# Patient Record
Sex: MALE | Race: BLACK OR AFRICAN AMERICAN | NOT HISPANIC OR LATINO | URBAN - METROPOLITAN AREA
[De-identification: names, ages, dates, MRNs, and addresses within clinical notes are randomized per-mention and may not be internally consistent; named-entity substitution may affect disease eponyms.]

---

## 2018-08-14 ENCOUNTER — EMERGENCY (EMERGENCY)
Facility: HOSPITAL | Age: 53
LOS: 1 days | Discharge: HOME | End: 2018-08-14
Attending: EMERGENCY MEDICINE | Admitting: EMERGENCY MEDICINE
Payer: COMMERCIAL

## 2018-08-14 VITALS
SYSTOLIC BLOOD PRESSURE: 131 MMHG | RESPIRATION RATE: 18 BRPM | TEMPERATURE: 98 F | DIASTOLIC BLOOD PRESSURE: 85 MMHG | HEART RATE: 72 BPM | OXYGEN SATURATION: 98 %

## 2018-08-14 DIAGNOSIS — Z79.899 OTHER LONG TERM (CURRENT) DRUG THERAPY: ICD-10-CM

## 2018-08-14 DIAGNOSIS — R07.89 OTHER CHEST PAIN: ICD-10-CM

## 2018-08-14 DIAGNOSIS — I10 ESSENTIAL (PRIMARY) HYPERTENSION: ICD-10-CM

## 2018-08-14 LAB
ALBUMIN SERPL ELPH-MCNC: 4.1 G/DL — SIGNIFICANT CHANGE UP (ref 3.4–5)
ALP SERPL-CCNC: 68 U/L — SIGNIFICANT CHANGE UP (ref 40–120)
ALT FLD-CCNC: 26 U/L — SIGNIFICANT CHANGE UP (ref 12–42)
ANION GAP SERPL CALC-SCNC: 13 MMOL/L — SIGNIFICANT CHANGE UP (ref 9–16)
AST SERPL-CCNC: 16 U/L — SIGNIFICANT CHANGE UP (ref 15–37)
BASOPHILS NFR BLD AUTO: 1.1 % — SIGNIFICANT CHANGE UP (ref 0–2)
BILIRUB SERPL-MCNC: 0.6 MG/DL — SIGNIFICANT CHANGE UP (ref 0.2–1.2)
BUN SERPL-MCNC: 14 MG/DL — SIGNIFICANT CHANGE UP (ref 7–23)
CALCIUM SERPL-MCNC: 9.3 MG/DL — SIGNIFICANT CHANGE UP (ref 8.5–10.5)
CHLORIDE SERPL-SCNC: 102 MMOL/L — SIGNIFICANT CHANGE UP (ref 96–108)
CO2 SERPL-SCNC: 24 MMOL/L — SIGNIFICANT CHANGE UP (ref 22–31)
CREAT SERPL-MCNC: 1.33 MG/DL — HIGH (ref 0.5–1.3)
EOSINOPHIL NFR BLD AUTO: 5.8 % — SIGNIFICANT CHANGE UP (ref 0–6)
GLUCOSE SERPL-MCNC: 149 MG/DL — HIGH (ref 70–99)
HCT VFR BLD CALC: 44.8 % — SIGNIFICANT CHANGE UP (ref 39–50)
HGB BLD-MCNC: 14.8 G/DL — SIGNIFICANT CHANGE UP (ref 13–17)
IMM GRANULOCYTES NFR BLD AUTO: 0.6 % — SIGNIFICANT CHANGE UP (ref 0–1.5)
LYMPHOCYTES # BLD AUTO: 40.4 % — SIGNIFICANT CHANGE UP (ref 13–44)
MCHC RBC-ENTMCNC: 26.4 PG — LOW (ref 27–34)
MCHC RBC-ENTMCNC: 33 G/DL — SIGNIFICANT CHANGE UP (ref 32–36)
MCV RBC AUTO: 79.9 FL — LOW (ref 80–100)
MONOCYTES NFR BLD AUTO: 5.6 % — SIGNIFICANT CHANGE UP (ref 2–14)
NEUTROPHILS NFR BLD AUTO: 46.5 % — SIGNIFICANT CHANGE UP (ref 43–77)
NT-PROBNP SERPL-SCNC: 41 PG/ML — SIGNIFICANT CHANGE UP
PLATELET # BLD AUTO: 223 K/UL — SIGNIFICANT CHANGE UP (ref 150–400)
POTASSIUM SERPL-MCNC: 3.4 MMOL/L — LOW (ref 3.5–5.3)
POTASSIUM SERPL-SCNC: 3.4 MMOL/L — LOW (ref 3.5–5.3)
PROT SERPL-MCNC: 8.9 G/DL — HIGH (ref 6.4–8.2)
RBC # BLD: 5.61 M/UL — SIGNIFICANT CHANGE UP (ref 4.2–5.8)
RBC # FLD: 14.1 % — SIGNIFICANT CHANGE UP (ref 10.3–16.9)
SODIUM SERPL-SCNC: 139 MMOL/L — SIGNIFICANT CHANGE UP (ref 132–145)
TROPONIN I SERPL-MCNC: <0.017 NG/ML — LOW (ref 0.02–0.06)
WBC # BLD: 7 K/UL — SIGNIFICANT CHANGE UP (ref 3.8–10.5)
WBC # FLD AUTO: 7 K/UL — SIGNIFICANT CHANGE UP (ref 3.8–10.5)

## 2018-08-14 PROCEDURE — 71046 X-RAY EXAM CHEST 2 VIEWS: CPT | Mod: 26

## 2018-08-14 PROCEDURE — 99285 EMERGENCY DEPT VISIT HI MDM: CPT | Mod: 25

## 2018-08-14 PROCEDURE — 93010 ELECTROCARDIOGRAM REPORT: CPT | Mod: NC

## 2018-08-14 RX ORDER — ASPIRIN/CALCIUM CARB/MAGNESIUM 324 MG
325 TABLET ORAL ONCE
Qty: 0 | Refills: 0 | Status: COMPLETED | OUTPATIENT
Start: 2018-08-14 | End: 2018-08-14

## 2018-08-14 RX ADMIN — Medication 325 MILLIGRAM(S): at 15:32

## 2018-08-14 NOTE — ED PROVIDER NOTE - OBJECTIVE STATEMENT
53 y/o male with PMHx of HTN (on Hyzaar) presents to ED c/o constant left-sided CP, which worsens with movement, that began this morning at 7:30AM. Patient reports that he works as a  and [place] [employer] [place] when pain began. States that he expected the pain to improve on its own, but when it did not improve 8 hours later, he decided to make sure pain is not associated with the heart. Denies SOB, palpitations, nausea, vomiting, diaphoresis, HA, vision changes, neck pain, or back pain. Patient has not taken any pain meds PTA, and denies allergies to any meds, surgical history, and family history.

## 2018-08-14 NOTE — ED ADULT TRIAGE NOTE - CHIEF COMPLAINT QUOTE
pt c/o left chest wall pain that he states started this am while at work; is a  and noticed discomfort while pulling hand brake. states pain 6/10 that has been worsening throughout the day. hx htn on hyzaar

## 2018-08-14 NOTE — ED PROVIDER NOTE - CHPI ED SYMPTOMS NEG
no nausea/no palpitations, HA, vision changes, neck pain, back pain/no diaphoresis/no shortness of breath/no vomiting

## 2018-08-14 NOTE — ED PROVIDER NOTE - MEDICAL DECISION MAKING DETAILS
Will obtain CXR, EKG, labs, single troponin only as pain began 8 hours ago and is constant, give aspirin, and reassess. Will obtain CXR, EKG, labs, single troponin only as pain began 8 hours ago and is constant, give aspirin, and reassess.     work up negative will d/c with pmd and cards f/u.

## 2018-08-14 NOTE — ED ADULT NURSE NOTE - NSIMPLEMENTINTERV_GEN_ALL_ED
Implemented All Universal Safety Interventions:  [place] to call system. Call bell, personal items and telephone within reach. Instruct patient to call for assistance. Room bathroom lighting operational. Non-slip footwear when patient is off stretcher. Physically safe environment: no spills, clutter or unnecessary equipment. Stretcher in lowest position, wheels locked, appropriate side rails in place.

## 2018-08-14 NOTE — ED ADULT NURSE NOTE - CHPI ED NUR SYMPTOMS NEG
no vomiting/no congestion/no chills/no nausea/no diaphoresis/no dizziness/no shortness of breath/no back pain/no syncope/no fever

## 2018-08-14 NOTE — ED PROVIDER NOTE - ATTENDING CONTRIBUTION TO CARE
pt evaluated for chest pain, positional, normal EKG, looks well. normal physical exam. plna: EKG, trop, CXR. Work up normal. D/C w follow up

## 2018-08-14 NOTE — ED ADULT NURSE NOTE - OBJECTIVE STATEMENT
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 Pt has hx of hypertension, no other medical issues. Pt denies any SOB, no N/V/D, no fever/chills, no recent travel, no orthopnea, no peripheral edema.

## 2022-02-05 NOTE — ED PROVIDER NOTE - LOCATION
Patient was a total assist during time with PT, could only move patient with max assist from JAZZ Monteiro who was sitting for the patient today. Patient unable to ambulate or stand to urinate. Fallon in place until materials received to place male external catheter. pectoral region

## 2025-07-02 NOTE — ED PROVIDER NOTE - CPE EDP NEURO NORM
Physical Therapy Evaluation    Visit Type: Initial Evaluation  Visit: 1  Referring Provider: Lauyrn Livingston MD  Medical Diagnosis (from order): M25.561 - Acute pain of right knee   Treatment Diagnosis: RLE, right knee - increased pain/symptoms, impaired posture, impaired range of motion, impaired muscle length/flexibility, impaired joint play/mobility, impaired mobility, impaired tissue mobility, impaired gait, impaired activity tolerance and impaired strength.  Onset  - Date of onset: April 2025  - Date of exacerbation:  past 2-3 weeks  Chart reviewed at time of initial evaluation (relevant co-morbidities, allergies, tests and medication listed):   PMH: IBS, GERD, anemia  Allergies:  compazine, PCN    Right knee X-rays( 6/16/25):   FINDINGS/IMPRESSION:   No acute fracture or dislocation.  Joint space is maintained.  Tiny 2 mm calcification just inferior to the intercondylar notch on the AP  view could represent vascular calcification, soft tissue calcification, or  small loose body. Fabella noted.       - Diagnostic tests reviewed: X-Ray    SUBJECTIVE                                                                                                               She reports an insidious onset of right knee pain that began in April. She note some difficulty with moving from sit > stand, and initial steps were uncomfortable. She started to walk about 2 miles in the evening, but pain has been worsening, especially in the past 2-3 weeks . She reports some back issues in the past. She saw her PCP 6/16/25. Had X-rays and  was referred to physical therapy.   She presently reports intermittent pain in the posterior knee as well as some pain in the lateral calves. Pain described as sharp, then achy. The lateral calf pain is achy. She reports no swelling. She reports no issues with buckling,locking,or giving way. She reports no paresthesias at the knee or below. She reports intermittent felling of numbness/ cold right lateral  hip and thigh to the knee with prolonged standing that began in December ( before  knee pain). She rates her right knee pain as 8/10 currently. Pain ranges from 0/10 to 8/10.   Pain increased by: transfers ( sit > stand) , initial steps after sitting,stairs, prolonged walking ( > 10 minutes), kneeling, twisting, changing directions, walking at faster speeds, extending the knee fully, left side lying   Pain decreased by: walking in the pool, heat, changing positions, ibuprofen, Tylenol  She lives with her  and 2 children ( 14, 14 yo) in a 2 story house. Bedroom/ bathroom on the second floor. Laundry on the main floor.   She is a  .   She reports independence with her activities of daily living. She reports no pain.   She reports no pain with rolling in bed. She reports pain with transfers.   She reports independence with her instrumental activities of daily living . She reports pain with cooking, laundry, grocery shopping, driving, and yard work.  She  reports  sleep disturbances- wakes after 3-4 hours. Difficult to fall back asleep.     Pain / Symptoms  - Pain/symptom is: intermittent  - Pain rating (out of 10): Current: 1 ; Best: 0; Worst: 8  - Location: Right knee- refer to subjective  - Quality / Description: ache, sharp  - Alleviating Factors: heat, over-the-counter medication, change in position  - Progression since onset: worsening    Function:   Limitations / Exacerbation Factors:   - Patient reports pain and difficulty with function reported below.  - sleep disturbed, driving/riding in a vehicle, grocery shopping, house/yard work, standing tasks, kneeling, standing and walking, community distances, stairs, walking quickly as required to cross a street/exit a building rapidly  Prior Level of Function: pain free ADLs and IADLs. declining function, therefore referred to therapy,    Patient Goals: decreased pain, increased motion, increased strength, independence with  ADLs/IADLs, return to sport/leisure activities and return to work. \" To be able to walk and perform transfers without pain\"     Prior treatment  - no therapies  - Discharged from hospital, home health, or skilled nursing facility in last 30 days: no  Home Environment   - Patient lives with: significant other and children  - Type of home: multiple level home  - Bathroom setup: walk-in shower  - Assistance available: as needed  - Denies 2 or more falls or an unexplained fall with injury in the last year.  - Feel safe at home / work / school: yes      OBJECTIVE                                                                                                                    Observation   Bilateral lateral tilted patella.   Left calf larger than right .     Range of Motion (ROM)   (degrees unless noted; active unless noted; norms in ( ); negative=lacking to 0, positive=beyond 0)  WFL: LLE, RLE  Knee:   - Flexion (150):       Left:  115        Right:  110   Passive: 115   - Extension (0-10):       Left:  0        Right:  0   Comments: Knee range of motion ( supine) :   Left = 0 > 140 degrees  Right = 0 > 140 degrees    Strength  (out of 5 unless noted, standard test position unless noted)   Hip:    - Flexion:         Left: 5         Right: 5    - Extension:         Left: 4+         Right: 4+    - Abduction:         Left: 5         Right: 4+    - Adduction:         Left: 5         Right: 5    - External Rotation:         Left: 5         Right: 5  Knee:    - Flexion:         Left: 5         Right: 5 and pain    - Extension:         Left: 5         Right: 4+  Ankle:    - Plantar Flexion:         Left: 5         Right: 4+, pain    - Single leg heel raises (reps):          Left: 10         Right: 10    - Inversion:         Left: 5          Right: 5    - Eversion:         Left: 5          Right: 5  First MTP (Hallux):    - Extension:         Left: 5          Right: 5         Palpation  Fine crepitus left patella and coarse  crepitus right patella.   Tenderness to palpation right posteromedial> posterolateral knee.     Muscle Length Tests  - 90/90 Hamstring at knee:  - Left:  -35° - Right: -25°    Special Tests  Hip:   - TABBY Test:  Left: negative Right: negative  - FADIR Test:  Left: negative Right: negative  - Scour Test:  Left: negative Right: negative  Knee: Ligament  - Lachman's Test:  Left: negative Right: negative  - Anterior Lachman:  Right: negative  - Anterior Drawer Test:  Left: negative Right: negative  - Posterior Drawer Test:  Left: negative Right: negative  - Valgus Stress Test at 0 Degrees:  Left: negative Right: negative  - Varus Stress Test at 0 Degrees:  Left: negative Right: negative  - Valgus Stress Test at 30 Degrees:  Left: negative Right: negative  - Varus Stress Test at 30 Degrees:  Left: negative Right: negative  Knee: Meniscus   - Xavier's Test:  Left: negative Right: negative  Knee: Patella  - Patellar Apprehension Test:  Left: negative Right: negative  - Patellar Compression:  Left: negative Right: negative    Sensation/Dermatome Testing:    L1 (back, over trochanter and groin):     - Light Touch:   Left: intact  Right: intact  L2 (back, front of thigh to knee):     - Light Touch:  Left: intact  Right: intact  L3 (back, upper buttock, anterior thigh, knee, medial lower leg):     - Light Touch:  Left: intact  Right: intact  L4 (medial buttock, lateral thigh, medial leg, dorsum of foot, great toe):     - Light Touch:  Left: intact  Right: intact  L5 (buttock, posterior and lateral thigh, lateral aspect of leg, dorsum of foot, medial half of sole, 1-3rd toes):     - Light Touch:  Left: intact  Right: intact   S1 (buttock, thigh, posterior leg):     - Light Touch:  Left: intact  Right: intact     Bed Mobility  - Rolling left: independent  - Rolling right: independent  Transfers  Assistive devices: none  - Sit to stand: independent  - Stand to sit: independent  Decreased weight bearing on the right.      Ambulation / Gait  - Assistive device: none    Ambulates without an assistive device. Gait with some decreased heel strike on the right .  Ascends reciprocally and descends non- reciprocally using a railing.             Outcome/Assessments  Outcome Measures:   Lower Extremity Functional Scale: LEFS Calculated Total: 34 (0=extreme difficulty; 80=no difficulty) see flowsheet for additional documentation        Treatment     Therapeutic Exercise  Instructed in a home exercise program:   -Quad sets ( 10 reps/ 5 second hold)   -Straight leg raise x 10 reps each  -Side lying hip abduction x 10 reps each  -Side lying clamshells x 10 reps each  -Prone hip extension x 10 reps each.   Given a written copy.     Skilled input: verbal instruction/cues    Writer verbally educated and received verbal consent for hand placement, positioning of patient, and techniques to be performed today from patient for clothing adjustments for techniques, hand placement and palpation for techniques and therapist position for techniques as described above and how they are pertinent to the patient's plan of care.  Home Exercise Program  Access Code: [email or web address]  URL: [email or web address]  Date: 07/02/2025  Prepared by: Torsten Everett    Exercises  - Long Sitting Quad Set  - 5 x daily - 7 x weekly - 20 reps - 5 hold  - Straight Leg Raise  - 1 x daily - 7 x weekly - 30 reps  - Sidelying Hip Abduction  - 1 x daily - 7 x weekly - 30 reps  - Clam  - 1 x daily - 7 x weekly - 30 reps  - Prone Hip Extension  - 1 x daily - 7 x weekly - 30 reps      ASSESSMENT                                                                                                          53 year old patient has reported functional limitations listed above impacted by signs and symptoms consistent with treatment diagnosis below.  Treatment Diagnosis:   - Involved: RLE, right knee.  - Symptoms/impairments: increased pain/symptoms, impaired posture, impaired range  of motion, impaired muscle length/flexibility, impaired joint play/mobility, impaired mobility, impaired tissue mobility, impaired gait, impaired activity tolerance and impaired strength.    Patient presents with reports of right knee pain and decreased mobility. She demonstrates mild decreased knee range of motion, decreased lower extremity flexibility, decreased lower extremity strength, increased soft tissue tightness, and postural dysfunctions, which affect her ability to perform her daily activities without discomfort / difficulty, such as transfers, walking > 10 minutes, stairs, cooking, laundry, grocery shopping, yard work, and sleeping. She will benefit from skilled physical therapy services to address these deficits and allow her to perform her daily activities with decreased pain. Patient verbalizes motivation to participate in her rehab program.   Pain/symptoms after session (out of 10): 1    Prognosis: Patient will benefit from skilled therapy.  Rehabilitative potential is: good.  Predicted patient presentation: Low (stable) - Patient comorbidities and complexities, as defined above, will have little effect on progress for prescribed plan of care.  Education:   - Present and ready to learn: patient  - Results of above outlined education: Verbalizes understanding and Needs reinforcement    PLAN                                                                                                                         The following skilled interventions to be implemented to achieve goals listed below:  Gait Training (51195)  Neuromuscular Re-Education (50828)  Therapeutic Exercise (69901)  Electrical Stimulation Unattended (13985 or )  Heat/Cold (58465)  Ultrasound (53282)  Dry Needling  Manual Therapy (56653)  Therapeutic Activity (64400)    Frequency / Duration  2 times per week tapering as patient progresses for 10 weeks for an estimated total of 10 visits    Patient involved in and agreed to plan of  care and goals.  Attendance agreement reviewed with patient.    Suggestions for next session as indicated: Progress per plan of care. Review her home exercise program. Progress lower extremity strengthening- move to closed chain activities, Add hamstring stretch.     Goals  Long Term Goals: to be met by end of plan of care  1. Patient will transfer to and from sitting / standing from a 16 inch height to use toilets and low furniture in home and community independently and without compensation with patient reported right knee  manageable pain/symptoms of no > 2/10.  2. Patient will ambulate independently and without compensation for at least 15 minutes, with patient reported manageable right knee pain/symptoms of no >2/10 to allow community ambulation, such as grocery shopping.   3. Patient will ascend and descend one flight of stairs (12 steps or more) using reciprocal pattern, independently with rail(s) to complete home management and self care.  4. Patient will sleep a continuous minimum of 5-6 hours per night over the past week, including lying on her left side. .  5. Lower Extremity Functional Scale: Patient will score 43 or higher on The Lower Extremity Functional Scale to indicate a decreased level of difficulty with walking and moving around. (minimal clinically important difference: 9 points change)  6. Patient will be independent with progressed and modified home exercise program.          Therapy procedure time and total treatment time can be found documented on the Time Entry flowsheet     normal...